# Patient Record
Sex: FEMALE | Race: WHITE | NOT HISPANIC OR LATINO | Employment: OTHER | URBAN - METROPOLITAN AREA
[De-identification: names, ages, dates, MRNs, and addresses within clinical notes are randomized per-mention and may not be internally consistent; named-entity substitution may affect disease eponyms.]

---

## 2021-06-24 ENCOUNTER — HOSPITAL ENCOUNTER (OUTPATIENT)
Facility: CLINIC | Age: 31
Discharge: HOME | End: 2021-06-24
Attending: OBSTETRICS & GYNECOLOGY | Admitting: OBSTETRICS & GYNECOLOGY
Payer: COMMERCIAL

## 2021-06-24 ENCOUNTER — HOSPITAL ENCOUNTER (OUTPATIENT)
Facility: CLINIC | Age: 31
End: 2021-06-24
Admitting: OBSTETRICS & GYNECOLOGY
Payer: COMMERCIAL

## 2021-06-24 ENCOUNTER — HOSPITAL ENCOUNTER (EMERGENCY)
Facility: CLINIC | Age: 31
End: 2021-06-24

## 2021-06-24 VITALS — DIASTOLIC BLOOD PRESSURE: 73 MMHG | TEMPERATURE: 98.6 F | SYSTOLIC BLOOD PRESSURE: 121 MMHG

## 2021-06-24 LAB
ABO + RH BLD: NORMAL
BLD GP AB SCN SERPL QL: NORMAL
BLOOD BANK CMNT PATIENT-IMP: NORMAL
FETAL CELL SCN BLD QL ROSETTE: NORMAL
HGB F BLD QL KLEIH BETKE: NORMAL
RH IG VIALS RECOM PATIENT: NORMAL
SPECIMEN EXP DATE BLD: NORMAL

## 2021-06-24 PROCEDURE — 86850 RBC ANTIBODY SCREEN: CPT | Performed by: OBSTETRICS & GYNECOLOGY

## 2021-06-24 PROCEDURE — 86900 BLOOD TYPING SEROLOGIC ABO: CPT | Performed by: OBSTETRICS & GYNECOLOGY

## 2021-06-24 PROCEDURE — G0463 HOSPITAL OUTPT CLINIC VISIT: HCPCS

## 2021-06-24 PROCEDURE — 36415 COLL VENOUS BLD VENIPUNCTURE: CPT | Performed by: OBSTETRICS & GYNECOLOGY

## 2021-06-24 PROCEDURE — 86901 BLOOD TYPING SEROLOGIC RH(D): CPT | Performed by: OBSTETRICS & GYNECOLOGY

## 2021-06-24 PROCEDURE — 85461 HEMOGLOBIN FETAL: CPT | Performed by: OBSTETRICS & GYNECOLOGY

## 2021-06-24 PROCEDURE — 85460 HEMOGLOBIN FETAL: CPT | Performed by: OBSTETRICS & GYNECOLOGY

## 2021-06-24 RX ORDER — ONDANSETRON 2 MG/ML
4 INJECTION INTRAMUSCULAR; INTRAVENOUS EVERY 6 HOURS PRN
Status: DISCONTINUED | OUTPATIENT
Start: 2021-06-24 | End: 2021-06-24 | Stop reason: HOSPADM
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
## 2021-06-25 NOTE — PLAN OF CARE
1915  Report received from Marianne RIVER RN.  Assuming care at this time.      1925  Lab dispatch called to check on status of phlebotomist for draw of STAT labs that were ordered at 1822.      1951  Phlebotomist at bedside, labs drawn.      2023  RN called blood bank to check status of Type & screen and K-B.  Type complete, technologist states she is O positive.  - screen should be done in 10 minutes.  Per blood  here to transport k-b.      2045  RN called blood bank screen resulted.  No type in computer as results.     2048  Dr Wade paged with return call received.  Update included but not limited to:  Patient feeling good except for cough.  Type per technologist is O positive and screen is negative in computer.  K-B is sent out, will check for results and update if positive.    Plan per provider discharge to home with routine follow-up.  Patient may go to ED if short of breath for cough.      2100  Patient given discharge instructions verbalizes understanding and patient signed after visit summary signature page.  Patient and significant other in agreement with plan.    Patient instructed to report change in fetal movement, vaginal leaking of fluid or bleeding, abdominal pain, or any concerns related to the pregnancy to her physician or midwife.     2100  Discharged home, undelivered, ambulatory at 2100.

## 2022-03-22 ENCOUNTER — TELEPHONE (OUTPATIENT)
Dept: PSYCHIATRY | Facility: CLINIC | Age: 32
End: 2022-03-22
Payer: COMMERCIAL

## 2022-03-22 NOTE — TELEPHONE ENCOUNTER
PSYCHIATRY CLINIC PHONE INTAKE     SERVICES REQUESTED / INTERESTED IN          Other:  [facility]     Presenting Problem and Brief History                              What would you like to be seen for? (brief description):  Pt was diagnosed about 5 or 6 years ago. Pt's son was born in Nov of 2021. She is interested in seeing a psychiatrist to address depressive symptoms, feeling overwhelmed, crying often, feeling down. No issues with appetite, sleep not good. Pt is currently taking zoloft 50 mg, prescribed by midwives.   Have you received a mental health diagnosis? Yes   Which one (s): DANIEL  Is there any history of developmental delay?  No   Are you currently seeing a mental health provider?  Yes            Who / month last seen:  Therapist - Jcarlos Gresham at [facility] in [place].   Do you have mental health records elsewhere?  Yes  Will you sign a release so we can obtain them?  Yes    Have you ever been hospitalized for psychiatric reasons?  No  Describe:  NA    Do you have current thoughts of self-harm?  Yes  - Passive thoughts, no plan or intent.    Do you currently have thoughts of harming others?  No       Substance Use History     Do you have any history of alcohol / illicit drug use?  No  Describe:  NA  Have you ever received treatment for this?  No    Describe:  NA     Social History     Who is the patient's a guardian?  No    Name / number: NA  Have you had an ACT team in last 12 months?  No  Describe: NA   OK to leave a detailed voicemail?  Yes    Would you be interested in learning more about research opportunities for which you or your child may qualify? We can connect you with a team member for more information.  Yes  If yes, send an GetAFive message to Kavita Lozoya    Medical/ Surgical History                                   Patient Active Problem List   Diagnosis     Indication for care in labor or delivery          Medications             Current Outpatient Medications   Medication Sig  Dispense Refill     Prenatal MV-Min-Fe Fum-FA-DHA (PRENATAL 1 PO)            DISPOSITION      3/22/22 Intake complete. OK to scheduled [facility]. DA scheduled on 3/31 at 9am for 120 minutes. Pt will be scheduled with  on 4/14 once approved.     Rakel Richardson,

## 2022-03-31 ENCOUNTER — VIRTUAL VISIT (OUTPATIENT)
Dept: PSYCHIATRY | Facility: CLINIC | Age: 32
End: 2022-03-31
Attending: PSYCHOLOGIST
Payer: COMMERCIAL

## 2022-03-31 DIAGNOSIS — F33.1 MAJOR DEPRESSIVE DISORDER, RECURRENT EPISODE, MODERATE (H): ICD-10-CM

## 2022-03-31 DIAGNOSIS — F41.1 GENERALIZED ANXIETY DISORDER: Primary | ICD-10-CM

## 2022-03-31 PROCEDURE — 90791 PSYCH DIAGNOSTIC EVALUATION: CPT | Mod: 95

## 2022-04-03 ENCOUNTER — HEALTH MAINTENANCE LETTER (OUTPATIENT)
Age: 32
End: 2022-04-03

## 2022-04-04 ENCOUNTER — TELEPHONE (OUTPATIENT)
Dept: PSYCHIATRY | Facility: CLINIC | Age: 32
End: 2022-04-04
Payer: COMMERCIAL

## 2022-04-04 NOTE — TELEPHONE ENCOUNTER
"Provider called patient regarding MyChart message from April 2 describing audio, visual, and tactile hallucinations.  Patient stated this only happens when she gets less than 4 hours of sleep in a night, has happened \"a couple of times,\" the first approximately two months ago (and 2.5 months post-partum).  She reported, \"I feel safe.  Just irritable and tired.  I can keep taking care of the baby and myself.\"  She shared that her  is aware of these symptoms and has committed to sleeping with the baby in a separate room so she can get more sleep.     Provider shared [facility] number ([phone]), crisis hotline text number (881776), and local behavioral health EDs ([facility], [facility]).  Patient was appreciative and stated she would share resources with  and use them if symptoms worsened.  Provider offered earlier appt with [facility] on 04/07022, but patient is not available for earlier appt.  Patient confirmed appt with Dr. Marianne Kaufman/[facility] on 04/14/22.  "

## 2022-04-08 NOTE — PROGRESS NOTES
[facility] Department of Psychiatry ?     [phone] (Clinic)      F256/2B [place]?   [phone] (Fax)      [address] [facility]?           [place], [place]? [address]?       [facility]?   DIAGNOSTIC EVALUATION?   ?  Patient:? Marianne Ruiz    MRN:??[id number]   YOB: 1990    Evaluator:? Radha Jiang MA?   Date of Visit:? 03/31/2022    Supervisor: Tami Chanel, Ph.D., L.P.?     Patient was seen for 90-minute intake appointment. Limits of confidentiality and purpose of session (diagnostic intake and treatment recommendation) reviewed at the beginning of the session. Sources of information included interview with the patient, chart review, and additional information provided by the patient via [email or web address] / [employer] messaging and phone call after the interview (see [email or web address] message dated 03/31/2022 and phone encounter dated 04/04/2022).?     Type of service: Telemedicine?Diagnostic Evaluation?   Reason for Telemedicine Visit: COVID-19 public health recommendations on in-person sessions?   Mode of transmission: Secure real time interactive audio and visual telecommunication system (videoconference via [employer])?   Location of originating and distant sites:??   ???Originating site (patient location): patient residence?   ???Distant site (provider site): HIPAA compliant location within provider home/remote setting?   Telemedicine Visit: The patient's condition can be safely assessed and treated via synchronous audio and visual telemedicine encounter.???   Patient has agreed to receiving services via telemedicine technology.?     Identifying information:??   Patient is a 31-year-old,  female who is 4.5 months post-partum with her first child.     Chief Complaint:??   Marianne self-referred to the [facility].? She reported current symptoms of depression and anxiety that have increased in pregnancy and the post-partum period.  She attributes the worsening symptoms to lack of  "sleep and the demands of parenthood.  She described not feeling like herself, feeling  frozen, stuck, and hopeless,  and experiencing frequent frustration, irritability, and  a little anger.   Marianne reported that her symptoms worsened at 36 weeks pregnant, when she started experiencing  mad rage all the time.   She started Zoloft at that time, prescribed by Issac Ardon CNM at [facility], which she thinks helps with the rage and  deep sadness.\"  Marianne recently increased her dose from 25 mg to 50 mg.  She reported feeling better due to the  oxytocin rush  at birth, but that symptoms worsened again around 6 weeks post-partum when her baby started experiencing interrupted sleep and cried a lot due to GERD.  She also described having auditory, visual, and tactile hallucinations  a couple of times  when extremely sleep deprived starting two months ago at two-and-a-half months post-partum.  She has a strong relationship with a therapist who she has been seeing since 2016 and is primarily seeking medication management.     Mental Health History and Diagnostic Interview:??   Marianne completed a clinical interview and the [employer] Neuropsychiatric Interview to assist with our diagnostic assessment of current psychological functioning.      Marianne endorsed feeling down or depressed most of the day, nearly every day, for more than two weeks in the past, but not in the past two weeks.  In the past two weeks, she felt down or depressed approximately half of the days.? Marianne denied being much less interested in most things for two weeks, both previously and in the past two weeks.? She endorsed appetite changes (increased \"stress eating ); sleep changes (onset latency); feeling tired or without energy almost every day; having difficulty concentrating, thinking, and making decisions; increased irritability; and feeling worthless or guilty almost every day (e.g., for not knowing the  exact perfect " thing to do for my baby s development,  if her  gets up with the baby at night instead of her, and for having feelings of resentment). Marianne has had a period of at least two months in the past without depressive symptoms.  She denied current suicidal ideation, other than  passive, fleeting thoughts  of not wanting to be here.? She denied past or present plan, intent, means, or any suicide attempts.  She also denied any past or current self-injurious behavior.     Marianne also endorsed symptoms of general anxiety, including  probably  struggling with anxious thoughts about routine things more than most people, feeling excessively anxious over the past 6 months, anxieties and worries are present most days and are  somewhat  difficult to control, feeling restless and on edge, muscle tension, feeling exhausted easily, feeling irritable, and having difficulties sleeping.   Marianne also endorsed symptoms of panic in the past, including panic attacks that came on quickly, although not unexpectedly, from 2015 to 2017/2018.  Marianne s panic attacks occurred if she was stuck in traffic, on an airplane, or while giving blood at the doctor if she didn t have an  escape route.   At the time, she made significant changes in behavior because of the attacks and experienced racing or pounding heart, sweaty hands, shortness of breath, difficulty breathing, feeling pressure in the chest, nausea, feeling lightheaded, hot flushes, things around her feeling strange or unreal, feeling outside of or detached from part of her body, feelings of loss of control, and fear that she was dying.  Marianne stated that CBT has helped her manage these symptoms and that she considers her anxiety mostly  under control.      Marianne denied symptoms of psychosis during the clinical interview, but followed up after the interview with a [employer] message and subsequent phone call in which she stated that she has experienced auditory, visual, and tactile  hallucinations  a couple of times  when extremely sleep deprived (getting less than 2-4 hours per night).  Symptoms started two months ago at two-and-a-half months post-partum.  The hallucinations happen at night when she hasn t slept for 18-24 hours and consist of feeling like someone is watching her, thinking  this is a simulation, we are living in a simulation, and I m an experiment they re trying to break,  hearing men talking like they are in a work meeting discussing how to  break  her, feeling like there are bugs crawling under her skin, and flashes of light.  She shared that she didn t want to admit to these symptoms during the initial interview, because she didn t want to seem  crazy.      Marianne shared that she wonders if she has Attention Deficit Hyperactivity Disorder (ADHD) due to problems concentrating, being easily distracted, and feelings of restlessness.  She has shared these concerns with her therapist, but he  was more concerned about anxiety.   She stated that she would like a referral for an assessment to see if she has ADHD.       Marianne denied eating disorder symptoms.  She reported a low weight as an adult of 150 pounds and a high weight (while pregnant) of 210 pounds.  She stated that her body weight and shape greatly influenced how she felt for several years and that she has at times felt too fat, but that she has been moving toward body positivity in the past year.  She reported no body image concerns during pregnancy, stating  I knew I was making a human; it was amazing.   Marianne also denied a history of trauma and symptoms of PTSD, graham, bipolar disorder, and obsessive-compulsive disorder.     Marianne reported that her symptoms are causing significant distress and impairment at work (e.g., she s working fewer hours), home (e.g., motivation for housework), and in her relationships (e.g., less engaged), especially with her .?     Past Psychiatric History:??   Marianne reported an onset of  symptoms around 2016, at which time she was diagnosed with Generalized Anxiety Disorder and  depression.   She started seeing a mental health therapist at this time and finds therapy helpful.  She continues to see her therapist about once a month (for the past few years) and reported strong rapport with her therapist.  She noted that more frequent sessions might be warranted, but that she is overwhelmed by appointments (baby wellness visits, appointments for pets, her own medical care) at this time.      Marianne reported having tried several medications  on and off  since 2016, but does not remember which medications she took or for how long.  She discontinued these medications when she did not get the symptom relief she was seeking.  She reports no side effects from Zoloft other than  feeling funny, my head feels foggy  if she misses a dose.  Marianne denied any hospitalizations (other than giving birth), surgeries, or chronic physical or medical conditions.? She has a history of at least two concussions from playing hockey in high school.      Reproductive History:?   Marianne reported that her pregnancy was planned and that they conceived their first month of trying.  She reported no prior pregnancies.  She stated that she hated being pregnant and plans no future pregnancies or children.  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  She shared that she felt frustrated while pregnant, because she felt  weaker  and couldn t do the things she normally could do.     Her son's birth was  pretty much the opposite  of what Marianne wanted.  She planned an unmedicated waterbirth, but her membranes ruptured prematurely at 39 weeks.  Pitocin was used to start contractions, she received an epidural, and ended up with a third-degree perineal laceration.  She is  receiving physical therapy to help her pelvic floor heal and still does not feel  100%.   Her son was unable to successfully breastfeed due to an undiagnosed tongue-tie/ankyloglossia.  She tried pumping for ten days, but she never produced any breastmilk.  Her son lost a little too much weight at first and was slightly jaundiced, but required no treatment beyond finding the right formula for him.  He has been healthy since then and is meeting developmental milestones.  Marianne reported initial difficulty finding a formula that doesn t cause him discomfort and that they are now using an  amino acid formula  that costs approximately $500/month, which is causing financial strain.  She is hoping to get insurance to pay for the formula.     Marianne reported  instant bonding  with her baby, and that she would do anything for her  and son.  She stated,  I love him so much, but he is making my life miserable.   She shared that she has thoughts of wanting her old life back and occasionally  wishing he wasn t here,  but that she would never do anything to hurt him or herself.  She reported lack of sleep as the biggest issue and is getting frustrated because of all the contradictory advice about babies and sleep.       Substance Use History:?   Marianne denied alcohol use during pregnancy and reported having 5-6 drinks (beer, wine, or mixed drinks) weekly post-partum (2-3 drinks approximately twice a week).  She reported finding it  hard  not to drink at the end of the day, stating  that s what I want to numb me out.  I can t wait for Friday, when it s more acceptable to drink.  Which is sad, because I don t really care about alcohol.   Other than cravings, she denied any other symptoms of substance abuse and denied using recreational drugs, cannabis, or prescriptions drugs other than intended.  She referred to caffeine as  another vice  and drinks half a pot of coffee in the morning and one diet soda in the middle of the day.   She denied ever having used nicotine products.     Social History:??   Marianne was born in [place], [place] to parents who were and still are .  She has one brother who is four-and-a-half years older and with whom she has a  pretty good  relationship.  She stated that her family likes to get together and is happy to see each other, but that her parents  make things about them  and that they are  not that close.   She described her parents as  very good, not too strict.  They had boundaries, but basic ones.   She denied experiencing any verbal, physical, or sexual abuse or neglect in childhood or adulthood.     Marianne denied developmental delays or learning problems.  She described herself as a  perfectionist and people pleaser  in school.  She graduated from high school with a  near 4.0  and has worked as a  for the past ten years.  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  Marianne is  to a man, and her  works in manufacturing.  She, her , her son, and their dog put their home on the market and moved in with her  s mom and stepdad in February.  The decision to sell their home was both financial and because the home was too small.  They plan on living with her in-laws for up to a year in order to save money for a larger home.  She stated that money is tight and that they have no debt.     In January 2020, Marianne started a  dream job  working fulltime in [place] at [employer].  Due to COVID-19, she was laid off, which  changed the trajectory  of her life.  She is not sure what kind of work she wants to do moving forward and worries about putting her son in .  Additional stressors include lack of sleep, selling the house, financial worries, and feeling  pressure  to buy a new home.  She described these as  internal pressures,  because she wants to have a stable home and  life for her son.  She denied any legal problems.     Marianne identified strengths and resilience factors as being persistent, committed to making her life better, resourceful, and being a good mother.  She reported that  baby smiles,  exercise, and getting together with friends (even though she has to force herself to do it) help her cope with her symptoms.  She described her  as her  biggest support,  stating that he supports her in everything she does and every decision, and that they are a  good team.   Her mother-in-law is also helpful and will watch her son when she needs childcare.  She also named one close friend as part of her support system.  She identifies as atheist and does not belong to any community organizations.     Family Psychiatric History:??   Marianne reported that her father has  severe anxiety and depression  and that it is not managed well.  He takes medication for these, but has not done therapy.  She described this as a  big source of stress,  because she loves him, but he s  difficult  and she feels bad for her mom.  Marianne reported that both her mother and brother have depression as well.     Mental Status Exam:?   Marianne was casually dressed and appropriately groomed. Eye contact was appropriate.? She was cooperative, engaged, and responsive and answered the questions asked by the interviewer.? She was appropriately attuned and responsive to her baby s cues.  Her mood was euthymic and content appropriate.? Attention and concentration were not formally assessed but appeared intact. Her speech was normal in tone, rate, and volume. Her thought process was linear, logical, and goal-oriented.? Insight and judgment were intact. She denied any suicidal ideation. There was no evidence of perceptual disturbances during the session. She demonstrated insight into her symptoms and endorsed a motivation for treatment.?     Assessment: ??   Marianne is a 31-year-old,  woman who is four-and-a-half  months post-partum.? She has a history of Major Depressive Disorder (MDD), recurrent and Generalized Anxiety Disorder with panic attacks.? She is currently experiencing irritability, restlessness, hopelessness, lack of motivation, feeling tired and low energy, and difficulty concentrating.  Symptoms were likely exacerbated by pregnancy, the post-partum period, and sleep deprivation.? Her symptoms are best characterized by her previous diagnoses of Major Depressive Disorder (recurrent, moderate) and Generalized Anxiety Disorder.? Additionally, she experiences infrequent hallucinations in the context of insomnia and sleep deprivation; addressing sleep is a priority and symptoms should continue to be monitored after sleep is restored and balanced.? Marianne also endorsed some symptoms of Attention Deficit Hyperactivity Disorder, although there is symptom overlap with MDD, DANIEL, and insomnia.  We were unable to do a full assessment for ADHD in the context of this evaluation, however, symptoms should be monitored/assessed and ADHD is considered a rule-out at this time.?     Diagnosis:?   Generalized Anxiety Disorder - F41.1   Major Depressive Disorder, recurrent, moderate - F33.1     Rule Out - Attention Deficit Hyperactivity Disorder - referred for testing     Recommendations and Plan:? ??   Taken together, the results of this evaluation suggest that Marianne is likely to benefit from continued targeted treatment for her symptoms relating to depression, anxiety, and insomnia, including more frequent psychotherapy with her current provider (with whom she has an established relationships) and a review of psychiatric medication options.? She is scheduled for an appointment on 04/14/22 to discuss options for medication treatment with our psychiatric provider Dr. Kaufman.???     It was a pleasure meeting with Marianne. She can reach back out to our team with any questions or concerns regarding this report and our recommendations.?        Radha Jiang MA  Practicum Student      Psychological evaluation services were completed by the trainee under my direct supervision. I saw the patient with the trainee and participated in a portion of the service, including a discussion of the recommendation and plan. I agree with the findings and plan as documented in this note.     Tami Chanel, PhD,   Clinical Supervisor

## 2022-10-03 ENCOUNTER — HEALTH MAINTENANCE LETTER (OUTPATIENT)
Age: 32
End: 2022-10-03

## 2023-05-20 ENCOUNTER — HEALTH MAINTENANCE LETTER (OUTPATIENT)
Age: 33
End: 2023-05-20

## 2024-07-27 ENCOUNTER — HEALTH MAINTENANCE LETTER (OUTPATIENT)
Age: 34
End: 2024-07-27